# Patient Record
Sex: FEMALE | Race: WHITE | Employment: FULL TIME | URBAN - METROPOLITAN AREA
[De-identification: names, ages, dates, MRNs, and addresses within clinical notes are randomized per-mention and may not be internally consistent; named-entity substitution may affect disease eponyms.]

---

## 2022-07-27 ENCOUNTER — APPOINTMENT (OUTPATIENT)
Dept: GENERAL RADIOLOGY | Age: 27
End: 2022-07-27
Attending: STUDENT IN AN ORGANIZED HEALTH CARE EDUCATION/TRAINING PROGRAM
Payer: MEDICARE

## 2022-07-27 ENCOUNTER — HOSPITAL ENCOUNTER (EMERGENCY)
Age: 27
Discharge: HOME | End: 2022-07-27
Attending: STUDENT IN AN ORGANIZED HEALTH CARE EDUCATION/TRAINING PROGRAM
Payer: MEDICARE

## 2022-07-27 VITALS
HEIGHT: 64 IN | RESPIRATION RATE: 16 BRPM | TEMPERATURE: 98.3 F | DIASTOLIC BLOOD PRESSURE: 89 MMHG | OXYGEN SATURATION: 100 % | WEIGHT: 160 LBS | HEART RATE: 95 BPM | BODY MASS INDEX: 27.31 KG/M2 | SYSTOLIC BLOOD PRESSURE: 131 MMHG

## 2022-07-27 DIAGNOSIS — U07.1 COVID-19 VIRUS INFECTION: Primary | ICD-10-CM

## 2022-07-27 LAB — SARS-COV-2, COV2: NORMAL

## 2022-07-27 PROCEDURE — 99284 EMERGENCY DEPT VISIT MOD MDM: CPT

## 2022-07-27 PROCEDURE — 71045 X-RAY EXAM CHEST 1 VIEW: CPT

## 2022-07-27 PROCEDURE — U0003 INFECTIOUS AGENT DETECTION BY NUCLEIC ACID (DNA OR RNA); SEVERE ACUTE RESPIRATORY SYNDROME CORONAVIRUS 2 (SARS-COV-2) (CORONAVIRUS DISEASE [COVID-19]), AMPLIFIED PROBE TECHNIQUE, MAKING USE OF HIGH THROUGHPUT TECHNOLOGIES AS DESCRIBED BY CMS-2020-01-R: HCPCS

## 2022-07-27 RX ORDER — RISPERIDONE 1 MG/1
TABLET, FILM COATED ORAL DAILY
COMMUNITY

## 2022-07-27 RX ORDER — METHYLPHENIDATE 20 MG/9H
PATCH TRANSDERMAL
COMMUNITY

## 2022-07-27 RX ORDER — ALBUTEROL SULFATE 90 UG/1
2 AEROSOL, METERED RESPIRATORY (INHALATION)
Qty: 1 EACH | Refills: 0 | Status: SHIPPED | OUTPATIENT
Start: 2022-07-27

## 2022-07-27 RX ORDER — LAMOTRIGINE 100 MG/1
TABLET ORAL
COMMUNITY

## 2022-07-27 RX ORDER — OLANZAPINE 10 MG/1
INJECTION, POWDER, LYOPHILIZED, FOR SOLUTION INTRAMUSCULAR
COMMUNITY

## 2022-07-27 RX ORDER — LANSOPRAZOLE 30 MG/1
CAPSULE, DELAYED RELEASE ORAL
COMMUNITY

## 2022-07-27 RX ORDER — RIZATRIPTAN BENZOATE 10 MG/1
TABLET, ORALLY DISINTEGRATING ORAL
COMMUNITY

## 2022-07-27 RX ORDER — ONDANSETRON 8 MG/1
TABLET, ORALLY DISINTEGRATING ORAL
COMMUNITY

## 2022-07-27 RX ORDER — AZELASTINE HCL 205.5 UG/1
SPRAY NASAL
COMMUNITY

## 2022-07-27 RX ORDER — ERENUMAB-AOOE 70 MG/ML
INJECTION SUBCUTANEOUS
COMMUNITY

## 2022-07-27 RX ORDER — NORELGESTROMIN AND ETHINLY ESTRADIOL 150; 35 UG/D; UG/D
PATCH TRANSDERMAL
COMMUNITY

## 2022-07-27 RX ORDER — HYOSCYAMINE SULFATE 0.12 MG/1
TABLET SUBLINGUAL
COMMUNITY

## 2022-07-27 NOTE — ED TRIAGE NOTES
Patient states that covid-like symptoms of nasal congestion and cough began one week ago. She states chest wall pain began yesterday. Advises that she has had positive covid home tests yesterday.   She presents today for a more accurate test.

## 2022-07-28 ENCOUNTER — PATIENT OUTREACH (OUTPATIENT)
Dept: CASE MANAGEMENT | Age: 27
End: 2022-07-28

## 2022-07-28 LAB — SARS-COV-2, NAA: DETECTED

## 2022-07-28 NOTE — ED PROVIDER NOTES
EMERGENCY DEPARTMENT HISTORY AND PHYSICAL EXAM    Date: 7/27/2022  Patient Name: Lucille Hall    History of Presenting Illness     Chief Complaint   Patient presents with    Positive For Covid-19         History Provided By: Patient    16:06 PM  Lucille Hall is a 24 y.o. female with PMHX of anxiety, depression who presents to the emergency department C/O positive COVID-19 test.  Patient states she developed URI symptoms with cough, congestion, headache and some body aches 5 to 6 days ago. She and her boyfriend both had positive home COVID-19 tests. She states she states her chest also feels tight at times and is requesting albuterol inhaler as this is helped her with URIs in the past as well as the last time she had COVID-19. She is fully vaccinated against COVID-19 with booster. Pt denies fever, shortness of breath, vomiting, diarrhea, and any other sxs or complaints. PCP: None    Current Outpatient Medications   Medication Sig Dispense Refill    risperiDONE (RisperDAL) 1 mg tablet Take  by mouth daily. albuterol (PROVENTIL HFA, VENTOLIN HFA, PROAIR HFA) 90 mcg/actuation inhaler Take 2 Puffs by inhalation every four (4) hours as needed for Wheezing. 1 Each 0    azelastine (ASTEPRO) 205.5 mcg (0.15 %) azelastine 205.5 mcg (0.15 %) nasal spray      OLANZapine (ZyPREXA) 10 mg injection Zyprexa   Take 1 (.5) dissolvable tablet as needed. erenumab-aooe (Aimovig Autoinjector) 70 mg/mL injection Aimovig Autoinjector 70 mg/mL subcutaneous auto-injector      lamoTRIgine (LaMICtal) 100 mg tablet Lamictal 100 mg tablet   Take 1 tablet every day by oral route.       lansoprazole (PREVACID) 30 mg capsule lansoprazole 30 mg capsule,delayed release      methylphenidate (Daytrana) 20 mg/9 hr patch Daytrana 20 mg/9 hr daily patch      norelgestromin-ethinyl estradiol (Zafemy) 150-35 mcg/24 hr Zafemy 150 mcg-35 mcg/24 hr transdermal patch   APPLY 1 PATCH TOPICALLY ONCE A WEEK      ondansetron (ZOFRAN ODT) 8 mg disintegrating tablet ondansetron 8 mg disintegrating tablet      rizatriptan (MAXALT-MLT) 10 mg disintegrating tablet rizatriptan 10 mg disintegrating tablet      hyoscyamine SL (LEVSIN/SL) 0.125 mg SL tablet hyoscyamine 0.125 mg sublingual tablet   DISSOLVE 1 TABLET UNDER THE TONGUE EVERY 6 HOURS AS NEEDED         Past History     Past Medical History:  Past Medical History:   Diagnosis Date    Anxiety     Depression     Pleurisy     PTSD (post-traumatic stress disorder)        Past Surgical History:  Past Surgical History:   Procedure Laterality Date    HX HEENT      Jaw surgery       Family History:  History reviewed. No pertinent family history. Social History:  Social History     Tobacco Use    Smoking status: Never    Smokeless tobacco: Never   Substance Use Topics    Alcohol use: Yes    Drug use: Never       Allergies:  No Known Allergies      Review of Systems   Review of Systems   Constitutional:  Negative for fever. HENT:  Positive for congestion. Negative for sore throat. Respiratory:  Positive for cough. Gastrointestinal:  Negative for diarrhea and vomiting. Musculoskeletal:  Positive for myalgias. All other systems reviewed and are negative. Physical Exam     Vitals:    07/27/22 1426   BP: 131/89   Pulse: 95   Resp: 16   Temp: 98.3 °F (36.8 °C)   SpO2: 100%   Weight: 72.6 kg (160 lb)   Height: 5' 4\" (1.626 m)     Physical Exam  Vital signs and nursing notes reviewed. CONSTITUTIONAL: Alert. Well-appearing; well-nourished; in no apparent distress. HEAD: Normocephalic; atraumatic. EYES: Conjunctiva clear. ENT: TM's normal. External ear normal. Normal nose; no rhinorrhea. Normal pharynx. Tonsils not enlarged without exudate. Moist mucus membranes. NECK: Supple; FROM without difficulty, non-tender; no cervical lymphadenopathy. CV: Normal S1, S2; no murmurs, rubs, or gallops. No chest wall tenderness.   RESPIRATORY: Normal chest excursion with respiration; breath sounds clear and equal bilaterally; no wheezes, rhonchi, or rales. SKIN: Normal for age and race; warm; dry; good turgor; no apparent lesions or exudate. NEURO: A & O x3. PSYCH:  Mood and affect appropriate. Diagnostic Study Results     Labs -     Recent Results (from the past 12 hour(s))   SARS-COV-2    Collection Time: 07/27/22  2:32 PM   Result Value Ref Range    SARS-CoV-2 by PCR Please find results under separate order         Radiologic Studies -   XR CHEST PORT   Final Result      Normal study. CT Results  (Last 48 hours)      None          CXR Results  (Last 48 hours)                 07/27/22 1453  XR CHEST PORT Final result    Impression:      Normal study. Narrative:  CHEST PORTABLE       CPT CODE: 67455       COMPARISON: None       INDICATIONS: Nasal congestion and cough with chest pain. Positive COVID test.       FINDINGS: And mediastinum are normal. Lungs are clear with no infiltrates. No   pleural effusions are seen. No osseous abnormalities. Medications given in the ED-  Medications - No data to display      Medical Decision Making   I am the first provider for this patient. I reviewed the vital signs, available nursing notes, past medical history, past surgical history, family history and social history. Vital Signs-Reviewed the patient's vital signs. Records Reviewed: Nursing Notes      Procedures:  Procedures    ED Course:  16:06 PM   Initial assessment performed. The patients presenting problems have been discussed, and they are in agreement with the care plan formulated and outlined with them. I have encouraged them to ask questions as they arise throughout their visit. Provider Notes (Medical Decision Making): Bri Corrales is a 24 y.o. female presents with URI symptoms for last 5 to 6 days, positive home COVID-19 test.  She is fully vaccinated. Her vitals are stable, exam is unremarkable.   Chest x-ray shows no acute abnormalities. Over-the-counter medication for symptomatic relief and CDC isolation guidelines discussed. Diagnosis and Disposition       DISCHARGE NOTE:    Sudhir Powers's  results have been reviewed with her. She has been counseled regarding her diagnosis, treatment, and plan. She verbally conveys understanding and agreement of the signs, symptoms, diagnosis, treatment and prognosis and additionally agrees to follow up as discussed. She also agrees with the care-plan and conveys that all of her questions have been answered. I have also provided discharge instructions for her that include: educational information regarding their diagnosis and treatment, and list of reasons why they would want to return to the ED prior to their follow-up appointment, should her condition change. She has been provided with education for proper emergency department utilization. CLINICAL IMPRESSION:    1. COVID-19 virus infection        PLAN:  1. D/C Home  2. Discharge Medication List as of 7/27/2022  3:48 PM        START taking these medications    Details   albuterol (PROVENTIL HFA, VENTOLIN HFA, PROAIR HFA) 90 mcg/actuation inhaler Take 2 Puffs by inhalation every four (4) hours as needed for Wheezing., Normal, Disp-1 Each, R-0           CONTINUE these medications which have NOT CHANGED    Details   risperiDONE (RisperDAL) 1 mg tablet Take  by mouth daily. , Historical Med      azelastine (ASTEPRO) 205.5 mcg (0.15 %) azelastine 205.5 mcg (0.15 %) nasal spray, Historical Med      OLANZapine (ZyPREXA) 10 mg injection Zyprexa   Take 1 (.5) dissolvable tablet as needed., Historical Med      erenumab-aooe (Aimovig Autoinjector) 70 mg/mL injection Aimovig Autoinjector 70 mg/mL subcutaneous auto-injector, Historical Med      lamoTRIgine (LaMICtal) 100 mg tablet Lamictal 100 mg tablet   Take 1 tablet every day by oral route., Historical Med      lansoprazole (PREVACID) 30 mg capsule lansoprazole 30 mg capsule,delayed release, Historical Med      methylphenidate (Daytrana) 20 mg/9 hr patch Daytrana 20 mg/9 hr daily patch, Historical Med      norelgestromin-ethinyl estradiol (Zafemy) 150-35 mcg/24 hr Zafemy 150 mcg-35 mcg/24 hr transdermal patch   APPLY 1 PATCH TOPICALLY ONCE A WEEK, Historical Med      ondansetron (ZOFRAN ODT) 8 mg disintegrating tablet ondansetron 8 mg disintegrating tablet, Historical Med      rizatriptan (MAXALT-MLT) 10 mg disintegrating tablet rizatriptan 10 mg disintegrating tablet, Historical Med      hyoscyamine SL (LEVSIN/SL) 0.125 mg SL tablet hyoscyamine 0.125 mg sublingual tablet   DISSOLVE 1 TABLET UNDER THE TONGUE EVERY 6 HOURS AS NEEDED, Historical Med           3. Follow-up Information       Follow up With Specialties Details Why Contact Info    Your PCP or Urgent Care   As needed     [id number] [facility] EMERGENCY DEPT Emergency Medicine  As needed, If symptoms worsen [address] [facility]  [phone]          _______________________________      Please note that this dictation was completed with [employer], the computer voice recognition software. Quite often unanticipated grammatical, syntax, homophones, and other interpretive errors are inadvertently transcribed by the computer software. Please disregard these errors. Please excuse any errors that have escaped final proofreading.

## 2022-07-28 NOTE — PROGRESS NOTES
Ambulatory Care Coordination ED COVID Follow up Call    Challenges to be reviewed by the provider   Additional needs identified to be addressed with provider no  none           Encounter was not routed to provider for escalation. Method of communication with provider : none    Discussed COVID-19 related testing which was pending at this time. Test results were pending. Patient informed of results, if available? pending. Current Symptoms: no new symptoms and no worsening symptoms    Reviewed New or Changed Meds: yes    Do you have what you need at home? Durable Medical Equipment ordered at discharge: None  Home Health/Outpatient orders at discharge: none   Pulse oximeter? no Discussed and confirmed pulse oximeter discharge instructions and when to notify provider or seek emergency care. Patient education provided: Reviewed appropriate site of care based on symptoms and resources available to patient including:  when to seek medical attention . Follow up appointment recommended: no. If no appointment scheduled, scheduling offered: no.  No future appointments. Interventions: Reviewed and followed up on pending diagnostic tests and treatments-covid results pending  Reviewed discharge instructions, medical action plan and red flags with patient who verbalized understanding. Provided contact information for future needs. Plan for follow-up call in 1-2 days based on severity of symptoms and risk factors.   Plan for next call:  follow up test results      Glenn Eli LPN

## 2022-08-01 ENCOUNTER — PATIENT OUTREACH (OUTPATIENT)
Dept: CASE MANAGEMENT | Age: 27
End: 2022-08-01

## 2022-08-01 NOTE — PROGRESS NOTES
Ambulatory Care Coordination ED COVID Follow up Call    Challenges to be reviewed by the provider   Additional needs identified to be addressed with provider no  none           Encounter was not routed to provider for escalation. Method of communication with provider : none    Discussed COVID-19 related testing which was available at this time. Test results were positive. Patient informed of results, if available? no.     Current Symptoms: no new symptoms and no worsening symptoms      Patient education provided: Reviewed appropriate site of care based on symptoms and resources available to patient including: PCP. Follow up appointment recommended: no. If no appointment scheduled, scheduling offered: no.  No future appointments. Provided contact information for future needs. Plan for follow-up call in 5-7 days based on severity of symptoms and risk factors.   Plan for next call:  follow up      Gene Cedeno LPN

## 2022-08-08 ENCOUNTER — PATIENT OUTREACH (OUTPATIENT)
Dept: CASE MANAGEMENT | Age: 27
End: 2022-08-08

## 2022-08-08 NOTE — PROGRESS NOTES
Date/Time:  8/8/2022 3:08 PM   Call within 2 business days of discharge: Yes   Attempted to reach patient by telephone. Left HIPPA compliant message requesting a return call. This episode is resolved.